# Patient Record
Sex: MALE | Race: WHITE | NOT HISPANIC OR LATINO | URBAN - METROPOLITAN AREA
[De-identification: names, ages, dates, MRNs, and addresses within clinical notes are randomized per-mention and may not be internally consistent; named-entity substitution may affect disease eponyms.]

---

## 2020-10-19 ENCOUNTER — OFFICE VISIT (OUTPATIENT)
Dept: URBAN - METROPOLITAN AREA CLINIC 83 | Facility: CLINIC | Age: 80
End: 2020-10-19
Payer: MEDICARE

## 2020-10-19 PROCEDURE — 99213 OFFICE O/P EST LOW 20 MIN: CPT | Performed by: OPHTHALMOLOGY

## 2020-10-19 PROCEDURE — 67028 INJECTION EYE DRUG: CPT | Performed by: OPHTHALMOLOGY

## 2020-10-19 PROCEDURE — 92134 CPTRZ OPH DX IMG PST SGM RTA: CPT | Performed by: OPHTHALMOLOGY

## 2020-10-19 ASSESSMENT — INTRAOCULAR PRESSURE
OD: 15
OS: 17

## 2020-10-19 NOTE — IMPRESSION/PLAN
Impression: Exudative age-related macular degeneration, left eye, with active choroidal neovascularization: H35.3221. Left. s/p Lucentis 8/10/20 Plan: Stable PED on exam and OCT. I have recommended continuing anti-VEGF treatment and gradually extending the treatment interval as tolerated to maintain vision. The diagnosis, natural history, and prognosis of exudative AMD, as well as the R/B/A of anti-VEGF were discussed. The patient understands that treatment may not improve vision but should reduce the risk of further visual loss. The patient elects to continue intravitreal Lucentis treatment under OCT guidance. Lucentis 0.5 OS was performed successfully per protocol without complication. 

RTC 12 weeks OCT OU reeval Lucentis

## 2021-01-11 ENCOUNTER — OFFICE VISIT (OUTPATIENT)
Dept: URBAN - METROPOLITAN AREA CLINIC 83 | Facility: CLINIC | Age: 81
End: 2021-01-11
Payer: MEDICARE

## 2021-01-11 DIAGNOSIS — H35.3112 NONEXUDATIVE AGE-RELATED MACULAR DEGENERATION, RIGHT EYE, INTERMEDIATE DRY STAGE: ICD-10-CM

## 2021-01-11 PROCEDURE — 92134 CPTRZ OPH DX IMG PST SGM RTA: CPT | Performed by: OPHTHALMOLOGY

## 2021-01-11 PROCEDURE — 67028 INJECTION EYE DRUG: CPT | Performed by: OPHTHALMOLOGY

## 2021-01-11 PROCEDURE — 99213 OFFICE O/P EST LOW 20 MIN: CPT | Performed by: OPHTHALMOLOGY

## 2021-01-11 ASSESSMENT — INTRAOCULAR PRESSURE
OD: 14
OS: 16

## 2021-01-11 NOTE — IMPRESSION/PLAN
Impression: Exudative age-related macular degeneration, left eye, with active choroidal neovascularization: H35.3221. Left. s/p Lucentis 10/19/2020 Plan: Stable PED with mild SRF on exam and OCT. I have recommended continuing anti-VEGF treatment q12 weeks to maintain vision. The diagnosis, natural history, and prognosis of exudative AMD, as well as the R/B/A of anti-VEGF were discussed. The patient understands that treatment may not improve vision but should reduce the risk of further visual loss. The patient elects to continue intravitreal Lucentis treatment under OCT guidance. Lucentis 0.5 OS was performed successfully per protocol without complication. 

RTC 12 weeks OCT OU reeval Lucentis

## 2021-04-05 ENCOUNTER — OFFICE VISIT (OUTPATIENT)
Dept: URBAN - METROPOLITAN AREA CLINIC 54 | Facility: CLINIC | Age: 81
End: 2021-04-05
Payer: MEDICARE

## 2021-04-05 PROCEDURE — 67028 INJECTION EYE DRUG: CPT | Performed by: OPHTHALMOLOGY

## 2021-04-05 PROCEDURE — 92014 COMPRE OPH EXAM EST PT 1/>: CPT | Performed by: OPHTHALMOLOGY

## 2021-04-05 PROCEDURE — 92134 CPTRZ OPH DX IMG PST SGM RTA: CPT | Performed by: OPHTHALMOLOGY

## 2021-04-05 ASSESSMENT — INTRAOCULAR PRESSURE
OS: 19
OD: 19

## 2021-04-05 NOTE — IMPRESSION/PLAN
Impression: Exudative age-related macular degeneration, left eye, with active choroidal neovascularization: H35.3221. Left. s/p Lucentis 10/19/2020 Plan: Last treatment 12 weeks ago. Exam/OCT reveals new SRH along inferior border of PED OD. Recommend repeat treatment and shortening treatment interval.  Lucentis OS. 

RTC 4 weeks OCT OU reeval Lucentis

## 2021-05-03 ENCOUNTER — OFFICE VISIT (OUTPATIENT)
Dept: URBAN - METROPOLITAN AREA CLINIC 83 | Facility: CLINIC | Age: 81
End: 2021-05-03
Payer: MEDICARE

## 2021-05-03 PROCEDURE — 67028 INJECTION EYE DRUG: CPT | Performed by: OPHTHALMOLOGY

## 2021-05-03 PROCEDURE — 92134 CPTRZ OPH DX IMG PST SGM RTA: CPT | Performed by: OPHTHALMOLOGY

## 2021-05-03 ASSESSMENT — INTRAOCULAR PRESSURE
OS: 16
OD: 13

## 2021-06-07 ENCOUNTER — OFFICE VISIT (OUTPATIENT)
Dept: URBAN - METROPOLITAN AREA CLINIC 83 | Facility: CLINIC | Age: 81
End: 2021-06-07
Payer: MEDICARE

## 2021-06-07 PROCEDURE — 92134 CPTRZ OPH DX IMG PST SGM RTA: CPT | Performed by: OPHTHALMOLOGY

## 2021-06-07 PROCEDURE — 67028 INJECTION EYE DRUG: CPT | Performed by: OPHTHALMOLOGY

## 2021-06-07 ASSESSMENT — INTRAOCULAR PRESSURE
OD: 15
OS: 17

## 2021-06-07 NOTE — IMPRESSION/PLAN
Impression: Exudative age-related macular degeneration, left eye, with active choroidal neovascularization: H35.3221. Left. s/p Lucentis 5/3/21 Plan: S/P Lucentis 4 weeks ago. Exam/OCT reveals gradually improving SRH along inferior border of PED OD. Recommend repeat treatment q4 weeks. RBA discussed. Pt elects Lucentis OS, no complications enountered. 

RTC 4 weeks OCT OU reeval Lucentis
100

## 2021-07-12 ENCOUNTER — OFFICE VISIT (OUTPATIENT)
Dept: URBAN - METROPOLITAN AREA CLINIC 83 | Facility: CLINIC | Age: 81
End: 2021-07-12
Payer: MEDICARE

## 2021-07-12 PROCEDURE — 67028 INJECTION EYE DRUG: CPT | Performed by: OPHTHALMOLOGY

## 2021-07-12 PROCEDURE — 92134 CPTRZ OPH DX IMG PST SGM RTA: CPT | Performed by: OPHTHALMOLOGY

## 2021-07-12 ASSESSMENT — INTRAOCULAR PRESSURE
OD: 15
OS: 19

## 2021-07-12 NOTE — IMPRESSION/PLAN
Impression: Nonexudative age-related macular degeneration, right eye, intermediate dry stage: H35.3112. Right. Plan: Exam shows macular drusen, however, OCT confirms absence of IRF or SRF. AREDS-2 protocol antioxidant vitamins and Amsler grid self-monitoring were reviewed. The patient was encouraged to avoid smoking, wear UV protection, and call our office immediately upon noticing decreased vision or metamorphopsia.

## 2021-07-12 NOTE — IMPRESSION/PLAN
Impression: Exudative age-related macular degeneration, left eye, with active choroidal neovascularization: H35.3221. Left. s/p Lucentis 6/7/21 Plan: S/P Lucentis 5 weeks ago. Exam/OCT continue to demonstrate gradually improving SRH along inferior border of PED OD. Recommend repeat treatment q5 weeks. RBA discussed. Pt elects Lucentis OS, no complications encountered. 

5 weeks [employer]
10 weeks for OCT OU re-eval for [facility]

## 2021-09-20 ENCOUNTER — OFFICE VISIT (OUTPATIENT)
Dept: URBAN - METROPOLITAN AREA CLINIC 83 | Facility: CLINIC | Age: 81
End: 2021-09-20
Payer: MEDICARE

## 2021-09-20 DIAGNOSIS — H35.3221 EXUDATIVE AGE-RELATED MACULAR DEGENERATION, LEFT EYE, WITH ACTIVE CHOROIDAL NEOVASCULARIZATION: Primary | ICD-10-CM

## 2021-09-20 PROCEDURE — 92134 CPTRZ OPH DX IMG PST SGM RTA: CPT | Performed by: OPHTHALMOLOGY

## 2021-09-20 PROCEDURE — 67028 INJECTION EYE DRUG: CPT | Performed by: OPHTHALMOLOGY

## 2021-09-20 PROCEDURE — 92014 COMPRE OPH EXAM EST PT 1/>: CPT | Performed by: OPHTHALMOLOGY

## 2021-09-20 ASSESSMENT — INTRAOCULAR PRESSURE
OS: 18
OD: 19

## 2021-09-20 NOTE — IMPRESSION/PLAN
Impression: Exudative age-related macular degeneration, left eye, with active choroidal neovascularization: H35.3221. Left. s/p Lucentis OS 8/16/21 Plan: S/P Lucentis 5 weeks ago in [place], [place]. Exam/OCT reveal resolved SRH along inferior border of PED OD. No definite fluid. Recommend repeat treatment with extension to 6 weeks. RBA discussed. Pt elects Lucentis OS, no complications encountered. 

6 weeks for OCT OU re-eval for [facility]

## 2021-11-01 ENCOUNTER — OFFICE VISIT (OUTPATIENT)
Dept: URBAN - METROPOLITAN AREA CLINIC 83 | Facility: CLINIC | Age: 81
End: 2021-11-01
Payer: MEDICARE

## 2021-11-01 DIAGNOSIS — H02.839 DERMATOCHALASIS OF EYELID: ICD-10-CM

## 2021-11-01 DIAGNOSIS — Z96.1 PRESENCE OF INTRAOCULAR LENS: ICD-10-CM

## 2021-11-01 PROCEDURE — 67028 INJECTION EYE DRUG: CPT | Performed by: OPHTHALMOLOGY

## 2021-11-01 PROCEDURE — 92134 CPTRZ OPH DX IMG PST SGM RTA: CPT | Performed by: OPHTHALMOLOGY

## 2021-11-01 ASSESSMENT — INTRAOCULAR PRESSURE
OS: 17
OD: 15

## 2021-11-01 NOTE — IMPRESSION/PLAN
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 Plan: Visually significant.   Refer for eval/management

## 2021-11-01 NOTE — IMPRESSION/PLAN
Impression: Exudative age-related macular degeneration, left eye, with active choroidal neovascularization: H35.3221. Left. s/p Lucentis OS 9/20/21
last full DFE OU 9/20/21 Plan: Exam/OCT demonstrate a stable PED OD. No definite fluid or heme. Recommend repeat treatment with maintenance of treatment interval at 6 weeks. RBA discussed. Pt elects Lucentis OS, no complications encountered. 

6 weeks for OCT OU re-eval for Lucentis 0.5 vs Eylea

## 2021-11-11 ENCOUNTER — OFFICE VISIT (OUTPATIENT)
Dept: URBAN - NONMETROPOLITAN AREA CLINIC 1 | Facility: CLINIC | Age: 81
End: 2021-11-11
Payer: MEDICARE

## 2021-11-11 DIAGNOSIS — D31.32 BENIGN NEOPLASM OF LEFT CHOROID: ICD-10-CM

## 2021-11-11 DIAGNOSIS — H35.3223 EXUDATIVE AGE-REL MCLR DEGN, LEFT EYE, WITH INACTIVE SCAR: Primary | ICD-10-CM

## 2021-11-11 DIAGNOSIS — H35.40 PERIPHERAL RETINAL DEGENERATION: ICD-10-CM

## 2021-11-11 DIAGNOSIS — H35.371 PUCKERING OF MACULA, RIGHT EYE: ICD-10-CM

## 2021-11-11 PROCEDURE — 92082 INTERMEDIATE VISUAL FIELD XM: CPT | Performed by: OPTOMETRIST

## 2021-11-11 PROCEDURE — 99204 OFFICE O/P NEW MOD 45 MIN: CPT | Performed by: OPTOMETRIST

## 2021-11-11 PROCEDURE — 92250 FUNDUS PHOTOGRAPHY W/I&R: CPT | Performed by: OPTOMETRIST

## 2021-11-11 ASSESSMENT — INTRAOCULAR PRESSURE
OS: 15
OD: 16

## 2021-11-11 ASSESSMENT — KERATOMETRY
OD: 42.38
OS: 42.50

## 2021-11-11 NOTE — IMPRESSION/PLAN
Impression: Nexdtve age-related mclr degn, right eye, intermed dry stage: H35.3112. Plan: Monitor vision daily. Changes in vision call the office. AREDS daily. Follow up with Dr. Lesa Simmonds as scheduled.

## 2021-11-11 NOTE — IMPRESSION/PLAN
Impression: Exudative age-rel mclr degn, left eye, with inactive scar: C21.9578. Plan: Monitor vision daily. Changes in vision call the office. AREDS daily. Follow up with Dr. Raajni Vaca as scheduled.

## 2021-12-13 ENCOUNTER — OFFICE VISIT (OUTPATIENT)
Dept: URBAN - METROPOLITAN AREA CLINIC 83 | Facility: CLINIC | Age: 81
End: 2021-12-13
Payer: MEDICARE

## 2021-12-13 PROCEDURE — 67028 INJECTION EYE DRUG: CPT | Performed by: OPHTHALMOLOGY

## 2021-12-13 PROCEDURE — 92134 CPTRZ OPH DX IMG PST SGM RTA: CPT | Performed by: OPHTHALMOLOGY

## 2021-12-13 ASSESSMENT — INTRAOCULAR PRESSURE
OS: 18
OD: 14

## 2021-12-13 NOTE — IMPRESSION/PLAN
Impression: Exudative age-related macular degeneration, left eye, with active choroidal neovascularization: H35.3221. Left. s/p Lucentis OS 11/01/21
last full DFE OU 9/20/21 Plan: Exam/OCT demonstrate a stable, large PED OD. No obvious fluid or heme. Recommend repeat treatment with maintenance of treatment interval at 6 weeks. RBA discussed. Pt elects Lucentis OS, no complications encountered. 

6 weeks for OCT OU re-eval for Lucentis 0.5 vs Eylea

## 2021-12-13 NOTE — IMPRESSION/PLAN
Impression: Nonexudative age-related macular degeneration, right eye, intermediate dry stage: H35.3112. Right. Plan: Exam shows stable macular drusen. OCT confirms absence of IRF or SRF. AREDS-2 protocol antioxidant vitamins and Amsler grid self-monitoring were reviewed. The patient was encouraged to avoid smoking, wear UV protection, and call our office immediately upon noticing decreased vision or metamorphopsia.

## 2022-01-24 ENCOUNTER — OFFICE VISIT (OUTPATIENT)
Dept: URBAN - METROPOLITAN AREA CLINIC 83 | Facility: CLINIC | Age: 82
End: 2022-01-24
Payer: MEDICARE

## 2022-01-24 PROCEDURE — 92134 CPTRZ OPH DX IMG PST SGM RTA: CPT | Performed by: OPHTHALMOLOGY

## 2022-01-24 PROCEDURE — 67028 INJECTION EYE DRUG: CPT | Performed by: OPHTHALMOLOGY

## 2022-01-24 ASSESSMENT — INTRAOCULAR PRESSURE
OD: 14
OS: 19

## 2022-01-24 NOTE — IMPRESSION/PLAN
Impression: Exudative age-related macular degeneration, left eye, with active choroidal neovascularization: H35.3221. Left. s/p Lucentis OS 12/13/21
last full DFE OU 9/20/21 Plan: Exam/OCT continue to reveal a stable, large PED OS. Still no obvious fluid or heme. Recommend repeat treatment with maintenance of treatment interval at 6 weeks. RBA discussed. Pt elects Lucentis OS, no complications encountered. 

6 weeks for OCT OU re-eval for Lucentis 0.5 vs Eylea, DFE OU (semiannual)

## 2022-01-24 NOTE — IMPRESSION/PLAN
Impression: Nonexudative age-related macular degeneration, right eye, intermediate dry stage: H35.3112. Right. Plan: Exam reveals stable macular drusen. OCT confirms absence of IRF or SRF. AREDS-2 protocol antioxidant vitamins and Amsler grid self-monitoring were reviewed. The patient was encouraged to avoid smoking, wear UV protection, and call our office immediately upon noticing decreased vision or metamorphopsia.

## 2022-03-14 ENCOUNTER — OFFICE VISIT (OUTPATIENT)
Dept: URBAN - METROPOLITAN AREA CLINIC 83 | Facility: CLINIC | Age: 82
End: 2022-03-14
Payer: MEDICARE

## 2022-03-14 PROCEDURE — 92134 CPTRZ OPH DX IMG PST SGM RTA: CPT | Performed by: OPHTHALMOLOGY

## 2022-03-14 PROCEDURE — 67028 INJECTION EYE DRUG: CPT | Performed by: OPHTHALMOLOGY

## 2022-03-14 PROCEDURE — 92014 COMPRE OPH EXAM EST PT 1/>: CPT | Performed by: OPHTHALMOLOGY

## 2022-03-14 ASSESSMENT — INTRAOCULAR PRESSURE
OS: 16
OD: 15

## 2022-03-14 NOTE — IMPRESSION/PLAN
Impression: Exudative age-related macular degeneration, left eye, with active choroidal neovascularization: H35.3221. Left. s/p Lucentis OS 1/24/22
last full DFE OU 9/20/21 Plan: Both eyes are due for full dilated semiannual exam today. Exam/OCT demonstrate a stable PED OS. Still no obvious fluid or heme. Recommend repeat treatment with extension of treatment interval to 8 weeks. RBA discussed. Pt elects Lucentis OS, no complications encountered. 

8 weeks for OCT OU Lucentis 0.5 OS #2/3 (straight)

## 2022-05-09 ENCOUNTER — PROCEDURE (OUTPATIENT)
Dept: URBAN - METROPOLITAN AREA CLINIC 83 | Facility: CLINIC | Age: 82
End: 2022-05-09
Payer: MEDICARE

## 2022-05-09 DIAGNOSIS — H35.3221 EXUDATIVE AGE-RELATED MACULAR DEGENERATION, LEFT EYE, WITH ACTIVE CHOROIDAL NEOVASCULARIZATION: Primary | ICD-10-CM

## 2022-05-09 PROCEDURE — 67028 INJECTION EYE DRUG: CPT | Performed by: OPHTHALMOLOGY

## 2022-05-09 PROCEDURE — 92134 CPTRZ OPH DX IMG PST SGM RTA: CPT | Performed by: OPHTHALMOLOGY

## 2022-05-09 ASSESSMENT — INTRAOCULAR PRESSURE
OS: 18
OD: 16

## 2022-05-09 NOTE — IMPRESSION/PLAN
Impression: Exudative age-related macular degeneration, left eye, with active choroidal neovascularization: H35.3221. Left. s/p Lucentis OS 03/14/2022
last full DFE OU 3/14/22 Plan: OCT: stable PED OS. No definite fluid or heme. Recommend repeat treatment, maintain q8 week treatment frequency. RBA discussed. Pt elects Lucentis OS, no complications encountered. 

8 weeks for OCT OU Lucentis 0.5 OS #3/3 (straight)

## 2022-07-18 ENCOUNTER — PROCEDURE (OUTPATIENT)
Dept: URBAN - METROPOLITAN AREA CLINIC 83 | Facility: CLINIC | Age: 82
End: 2022-07-18
Payer: MEDICARE

## 2022-07-18 DIAGNOSIS — H35.3221 EXUDATIVE AGE-RELATED MACULAR DEGENERATION, LEFT EYE, WITH ACTIVE CHOROIDAL NEOVASCULARIZATION: Primary | ICD-10-CM

## 2022-07-18 PROCEDURE — 67028 INJECTION EYE DRUG: CPT | Performed by: OPHTHALMOLOGY

## 2022-07-18 PROCEDURE — 92134 CPTRZ OPH DX IMG PST SGM RTA: CPT | Performed by: OPHTHALMOLOGY

## 2022-07-18 ASSESSMENT — INTRAOCULAR PRESSURE
OD: 11
OS: 11

## 2022-07-18 NOTE — IMPRESSION/PLAN
Impression: Exudative age-related macular degeneration, left eye, with active choroidal neovascularization: H35.3221. Left. s/p Lucentis OS 05/09/2022 Plan: OCT: stable PED OS at 10 weeks s/p Lucentis. No recurrent fluid or heme. Recommend repeat treatment, extend to q10 week treatment interval.  RBA discussed. Pt elects Lucentis OS, no complications encountered. 

10 weeks for OCT OU, Re-Eval Lucentis 0.5 OS

## 2022-09-26 ENCOUNTER — OFFICE VISIT (OUTPATIENT)
Dept: URBAN - METROPOLITAN AREA CLINIC 83 | Facility: CLINIC | Age: 82
End: 2022-09-26
Payer: MEDICARE

## 2022-09-26 DIAGNOSIS — Z96.1 PRESENCE OF INTRAOCULAR LENS: ICD-10-CM

## 2022-09-26 DIAGNOSIS — H35.371 PUCKERING OF MACULA, RIGHT EYE: ICD-10-CM

## 2022-09-26 DIAGNOSIS — H35.3231 EXUDATIVE AGE-RELATED MACULAR DEGENERATION, BILATERAL, WITH ACTIVE CHOROIDAL NEOVASCULARIZATION: Primary | ICD-10-CM

## 2022-09-26 DIAGNOSIS — H35.3221 EXUDATIVE AGE-RELATED MACULAR DEGENERATION, LEFT EYE, WITH ACTIVE CHOROIDAL NEOVASCULARIZATION: ICD-10-CM

## 2022-09-26 PROCEDURE — 92134 CPTRZ OPH DX IMG PST SGM RTA: CPT | Performed by: OPHTHALMOLOGY

## 2022-09-26 PROCEDURE — 99214 OFFICE O/P EST MOD 30 MIN: CPT | Performed by: OPHTHALMOLOGY

## 2022-09-26 ASSESSMENT — INTRAOCULAR PRESSURE
OD: 12
OS: 12

## 2022-09-26 NOTE — IMPRESSION/PLAN
Impression: Exudative age-related macular degeneration, bilateral, with active choroidal neovascularization: H35.3231.
s/p Lucentis OS 07/18/2022 New-onset [address] OD 9/26/22 Plan: --exam/OCT reveal new-onset SRH OD secondary to CNV
--stable PED with resolved SRF OS
--findings/diagnosis d/w patient in detail --rec initiation of monthly anti-VEGF therapy OD to maintain vision  
--rec reducing tx interval OS to q12 weeks
--r/b/a of Avastin OU for NVAMD discussed --pt understands Avastin is considered off-label for NVAMD
--pt elects to proceed intravitreal Avastin 1.25 mg/.05 ml OU
--injection OU performed w/o complication, overfill discarded
--plan series of 3 monthly injections OD to stabilize CNV
--pt instructed to call immediately with s/s [facility] loss/pain RTC 1 mo for Lucentis 0.5 OD #2/3 (straight)

## 2022-10-24 ENCOUNTER — PROCEDURE (OUTPATIENT)
Dept: URBAN - METROPOLITAN AREA CLINIC 83 | Facility: CLINIC | Age: 82
End: 2022-10-24
Payer: MEDICARE

## 2022-10-24 DIAGNOSIS — H35.3221 EXUDATIVE AGE-RELATED MACULAR DEGENERATION, LEFT EYE, WITH ACTIVE CHOROIDAL NEOVASCULARIZATION: ICD-10-CM

## 2022-10-24 DIAGNOSIS — H35.3231 EXUDATIVE AGE-RELATED MACULAR DEGENERATION, BILATERAL, WITH ACTIVE CHOROIDAL NEOVASCULARIZATION: Primary | ICD-10-CM

## 2022-10-24 PROCEDURE — 67028 INJECTION EYE DRUG: CPT | Performed by: OPHTHALMOLOGY

## 2022-10-24 ASSESSMENT — INTRAOCULAR PRESSURE
OS: 16
OD: 14

## 2022-11-03 ENCOUNTER — OFFICE VISIT (OUTPATIENT)
Dept: URBAN - NONMETROPOLITAN AREA CLINIC 1 | Facility: CLINIC | Age: 82
End: 2022-11-03
Payer: MEDICARE

## 2022-11-03 DIAGNOSIS — H35.3223 EXUDATIVE AGE-REL MCLR DEGN, LEFT EYE, WITH INACTIVE SCAR: ICD-10-CM

## 2022-11-03 DIAGNOSIS — H35.3211 EXDTVE AGE-REL MCLR DEGN, RIGHT EYE, WITH ACTV CHRDL NEOVAS: Primary | ICD-10-CM

## 2022-11-03 PROCEDURE — 92250 FUNDUS PHOTOGRAPHY W/I&R: CPT | Performed by: OPTOMETRIST

## 2022-11-03 PROCEDURE — 99213 OFFICE O/P EST LOW 20 MIN: CPT | Performed by: OPTOMETRIST

## 2022-11-03 ASSESSMENT — KERATOMETRY
OD: 42.75
OS: 42.75

## 2022-11-03 ASSESSMENT — INTRAOCULAR PRESSURE
OD: 16
OS: 16

## 2022-11-21 ENCOUNTER — OFFICE VISIT (OUTPATIENT)
Dept: URBAN - METROPOLITAN AREA CLINIC 83 | Facility: CLINIC | Age: 82
End: 2022-11-21
Payer: MEDICARE

## 2022-11-21 DIAGNOSIS — H35.3211 EXUDATIVE AGE-RELATED MACULAR DEGENERATION, RIGHT EYE, WITH ACTIVE CHOROIDAL NEOVASCULARIZATION: Primary | ICD-10-CM

## 2022-11-21 PROCEDURE — 67028 INJECTION EYE DRUG: CPT | Performed by: OPHTHALMOLOGY

## 2022-11-21 ASSESSMENT — INTRAOCULAR PRESSURE
OS: 13
OD: 12

## 2022-12-19 ENCOUNTER — OFFICE VISIT (OUTPATIENT)
Dept: URBAN - METROPOLITAN AREA CLINIC 83 | Facility: CLINIC | Age: 82
End: 2022-12-19
Payer: MEDICARE

## 2022-12-19 DIAGNOSIS — Z96.1 PRESENCE OF INTRAOCULAR LENS: ICD-10-CM

## 2022-12-19 DIAGNOSIS — H35.3231 EXUDATIVE AGE-RELATED MACULAR DEGENERATION, BILATERAL, WITH ACTIVE CHOROIDAL NEOVASCULARIZATION: Primary | ICD-10-CM

## 2022-12-19 DIAGNOSIS — H35.371 PUCKERING OF MACULA, RIGHT EYE: ICD-10-CM

## 2022-12-19 PROCEDURE — 92134 CPTRZ OPH DX IMG PST SGM RTA: CPT | Performed by: OPHTHALMOLOGY

## 2022-12-19 PROCEDURE — 99213 OFFICE O/P EST LOW 20 MIN: CPT | Performed by: OPHTHALMOLOGY

## 2022-12-19 ASSESSMENT — INTRAOCULAR PRESSURE
OS: 17
OD: 17

## 2022-12-19 NOTE — IMPRESSION/PLAN
Impression: Exudative age-related macular degeneration, bilateral, with active choroidal neovascularization: H35.3231.
s/p Lucentis OS 09/26/2022, OD 11/21/22 New-onset [address] OD 9/26/22 Plan: --exam/OCT reveal improving SRH OD s/p Lucentis 11/21/22
--stable PED with resolved SRF OS s/p Lucentis 9/26/22
--findings/diagnosis d/w patient in detail --rec  anti-VEGF therapy OU to maintain vision 
--rec cont tx interval OS at q12 weeks, OD q 6 weeks
--r/b/a of Avastin vs Lucentis 0.5 OU for NVAMD discussed --pt understands Avastin is considered off-label for NVAMD
--pt elects to proceed intravitreal Lucentis 0.5 OU
--injection OU performed w/o complication, overfill discarded --pt instructed to call immediately with s/s [facility] loss/pain RTC  6 weeks for Lucentis 0.5 OD  (straight) RTC 12 weeks for OCT OU re-eval for Lucentis

## 2023-02-06 ENCOUNTER — PROCEDURE (OUTPATIENT)
Facility: LOCATION | Age: 83
End: 2023-02-06
Payer: MEDICARE

## 2023-02-06 DIAGNOSIS — H35.3231 EXUDATIVE AGE-RELATED MACULAR DEGENERATION, BILATERAL, WITH ACTIVE CHOROIDAL NEOVASCULARIZATION: Primary | ICD-10-CM

## 2023-02-06 PROCEDURE — 67028 INJECTION EYE DRUG: CPT | Performed by: OPHTHALMOLOGY

## 2023-02-06 ASSESSMENT — INTRAOCULAR PRESSURE
OD: 18
OS: 19

## 2023-03-20 ENCOUNTER — OFFICE VISIT (OUTPATIENT)
Facility: LOCATION | Age: 83
End: 2023-03-20
Payer: MEDICARE

## 2023-03-20 DIAGNOSIS — Z96.1 PRESENCE OF INTRAOCULAR LENS: ICD-10-CM

## 2023-03-20 DIAGNOSIS — H35.371 PUCKERING OF MACULA, RIGHT EYE: ICD-10-CM

## 2023-03-20 DIAGNOSIS — H35.3231 EXUDATIVE AGE-RELATED MACULAR DEGENERATION, BILATERAL, WITH ACTIVE CHOROIDAL NEOVASCULARIZATION: Primary | ICD-10-CM

## 2023-03-20 PROCEDURE — 92134 CPTRZ OPH DX IMG PST SGM RTA: CPT | Performed by: OPHTHALMOLOGY

## 2023-03-20 PROCEDURE — 99213 OFFICE O/P EST LOW 20 MIN: CPT | Performed by: OPHTHALMOLOGY

## 2023-03-20 ASSESSMENT — INTRAOCULAR PRESSURE
OS: 21
OD: 22

## 2023-03-20 NOTE — IMPRESSION/PLAN
Impression: Exudative age-related macular degeneration, bilateral, with active choroidal neovascularization: H35.3231.
s/p Lucentis OS 12/19/2022, OD 02/06/23 New-onset [address] OD 9/26/22 Plan: --exam/OCT reveal resolved [address] OD s/p Lucentis 2/6/23
--stable PED with mild SRF OS s/p Lucentis 12/19/22
--findings/diagnosis d/w patient in detail --rec  anti-VEGF therapy OU to maintain vision 
--rec cont tx interval OS at q12 weeks, OD q6 weeks
--r/b/a of Avastin vs Lucentis 0.5 OU for NVAMD discussed --pt understands Avastin is considered off-label for NVAMD
--pt elects to proceed intravitreal Lucentis 0.5 OU
--injection OU performed w/o complication, overfill discarded --pt instructed to call immediately with s/s [facility] loss/pain RTC  6 weeks for Lucentis 0.5 OD  (straight) RTC 12 weeks for OCT OU re-eval for Lucentis

## 2023-05-01 ENCOUNTER — PROCEDURE (OUTPATIENT)
Facility: LOCATION | Age: 83
End: 2023-05-01
Payer: MEDICARE

## 2023-05-01 DIAGNOSIS — H35.3231 EXUDATIVE AGE-RELATED MACULAR DEGENERATION, BILATERAL, WITH ACTIVE CHOROIDAL NEOVASCULARIZATION: Primary | ICD-10-CM

## 2023-05-01 PROCEDURE — 67028 INJECTION EYE DRUG: CPT | Performed by: OPHTHALMOLOGY

## 2023-05-01 ASSESSMENT — INTRAOCULAR PRESSURE
OS: 21
OD: 20

## 2023-06-12 ENCOUNTER — OFFICE VISIT (OUTPATIENT)
Facility: LOCATION | Age: 83
End: 2023-06-12
Payer: MEDICARE

## 2023-06-12 DIAGNOSIS — H35.371 PUCKERING OF MACULA, RIGHT EYE: ICD-10-CM

## 2023-06-12 DIAGNOSIS — Z96.1 PRESENCE OF INTRAOCULAR LENS: ICD-10-CM

## 2023-06-12 DIAGNOSIS — H35.3231 EXUDATIVE AGE-RELATED MACULAR DEGENERATION, BILATERAL, WITH ACTIVE CHOROIDAL NEOVASCULARIZATION: Primary | ICD-10-CM

## 2023-06-12 PROCEDURE — 92134 CPTRZ OPH DX IMG PST SGM RTA: CPT | Performed by: OPHTHALMOLOGY

## 2023-06-12 PROCEDURE — 99213 OFFICE O/P EST LOW 20 MIN: CPT | Performed by: OPHTHALMOLOGY

## 2023-06-12 PROCEDURE — 67028 INJECTION EYE DRUG: CPT | Performed by: OPHTHALMOLOGY

## 2023-06-12 ASSESSMENT — INTRAOCULAR PRESSURE
OD: 16
OS: 14

## 2023-06-12 NOTE — IMPRESSION/PLAN
Impression: Exudative age-related macular degeneration, bilateral, with active choroidal neovascularization: H35.3231.
s/p Lucentis OS 03/20/23, OD 05/01/23 New-onset [address] OD 9/26/22 Plan: --exam/OCT reveal PED with mild, stable SRF OU 
--findings/diagnosis d/w patient in detail --rec cont tx interval OS at q12 weeks, OD q6 weeks
--r/b/a of Avastin vs Lucentis 0.5 OU for NVAMD discussed --pt understands Avastin is considered off-label for NVAMD
--pt elects to proceed intravitreal Lucentis 0.5 OU
--injection OU performed w/o complication, overfill discarded --pt instructed to call immediately with s/s [facility] loss/pain RTC  6 weeks for Lucentis 0.5 OD  (straight) RTC 12 weeks for OCT OU re-eval for Lucentis

## 2023-07-24 ENCOUNTER — PROCEDURE (OUTPATIENT)
Facility: LOCATION | Age: 83
End: 2023-07-24
Payer: MEDICARE

## 2023-07-24 DIAGNOSIS — H35.371 PUCKERING OF MACULA, RIGHT EYE: Primary | ICD-10-CM

## 2023-07-24 PROCEDURE — 67028 INJECTION EYE DRUG: CPT | Performed by: OPHTHALMOLOGY

## 2023-07-24 ASSESSMENT — INTRAOCULAR PRESSURE
OD: 20
OS: 17

## 2023-09-07 ENCOUNTER — OFFICE VISIT (OUTPATIENT)
Facility: LOCATION | Age: 83
End: 2023-09-07
Payer: MEDICARE

## 2023-09-07 DIAGNOSIS — H35.371 PUCKERING OF MACULA, RIGHT EYE: ICD-10-CM

## 2023-09-07 DIAGNOSIS — H35.3231 EXUDATIVE AGE-RELATED MACULAR DEGENERATION, BILATERAL, WITH ACTIVE CHOROIDAL NEOVASCULARIZATION: Primary | ICD-10-CM

## 2023-09-07 DIAGNOSIS — Z96.1 PRESENCE OF INTRAOCULAR LENS: ICD-10-CM

## 2023-09-07 PROCEDURE — 92134 CPTRZ OPH DX IMG PST SGM RTA: CPT | Performed by: OPHTHALMOLOGY

## 2023-09-07 PROCEDURE — 99213 OFFICE O/P EST LOW 20 MIN: CPT | Performed by: OPHTHALMOLOGY

## 2023-09-07 ASSESSMENT — INTRAOCULAR PRESSURE
OS: 19
OD: 20

## 2023-10-16 ENCOUNTER — OFFICE VISIT (OUTPATIENT)
Dept: URBAN - METROPOLITAN AREA CLINIC 7 | Facility: CLINIC | Age: 83
End: 2023-10-16
Payer: MEDICARE

## 2023-10-16 DIAGNOSIS — H35.3231 EXUDATIVE AGE-RELATED MACULAR DEGENERATION, BILATERAL, WITH ACTIVE CHOROIDAL NEOVASCULARIZATION: Primary | ICD-10-CM

## 2023-10-16 PROCEDURE — 67028 INJECTION EYE DRUG: CPT | Performed by: OPHTHALMOLOGY

## 2023-10-16 ASSESSMENT — INTRAOCULAR PRESSURE
OD: 17
OS: 15

## 2023-11-10 ENCOUNTER — OFFICE VISIT (OUTPATIENT)
Dept: URBAN - NONMETROPOLITAN AREA CLINIC 1 | Facility: CLINIC | Age: 83
End: 2023-11-10
Payer: MEDICARE

## 2023-11-10 DIAGNOSIS — H35.3231 EXUDATIVE AGE-RELATED MACULAR DEGENERATION, BILATERAL, WITH ACTIVE CHOROIDAL NEOVASCULARIZATION: Primary | ICD-10-CM

## 2023-11-10 DIAGNOSIS — H40.053 OCULAR HYPERTENSION, BILATERAL: ICD-10-CM

## 2023-11-10 DIAGNOSIS — Z96.1 PRESENCE OF INTRAOCULAR LENS: ICD-10-CM

## 2023-11-10 DIAGNOSIS — D31.32 BENIGN NEOPLASM OF LEFT CHOROID: ICD-10-CM

## 2023-11-10 PROCEDURE — 92250 FUNDUS PHOTOGRAPHY W/I&R: CPT | Performed by: OPTOMETRIST

## 2023-11-10 PROCEDURE — 99213 OFFICE O/P EST LOW 20 MIN: CPT | Performed by: OPTOMETRIST

## 2023-11-10 ASSESSMENT — VISUAL ACUITY
OD: 20/60
OS: 20/100

## 2023-11-10 ASSESSMENT — INTRAOCULAR PRESSURE
OD: 23
OS: 24

## 2023-11-10 ASSESSMENT — KERATOMETRY
OS: 42.38
OD: 42.13

## 2023-11-27 ENCOUNTER — OFFICE VISIT (OUTPATIENT)
Dept: URBAN - METROPOLITAN AREA CLINIC 7 | Facility: CLINIC | Age: 83
End: 2023-11-27
Payer: MEDICARE

## 2023-11-27 DIAGNOSIS — Z96.1 PRESENCE OF INTRAOCULAR LENS: ICD-10-CM

## 2023-11-27 DIAGNOSIS — H35.3231 EXUDATIVE AGE-RELATED MACULAR DEGENERATION, BILATERAL, WITH ACTIVE CHOROIDAL NEOVASCULARIZATION: Primary | ICD-10-CM

## 2023-11-27 DIAGNOSIS — D31.32 BENIGN NEOPLASM OF LEFT CHOROID: ICD-10-CM

## 2023-11-27 DIAGNOSIS — H40.053 OCULAR HYPERTENSION, BILATERAL: ICD-10-CM

## 2023-11-27 PROCEDURE — 99213 OFFICE O/P EST LOW 20 MIN: CPT | Performed by: OPHTHALMOLOGY

## 2023-11-27 PROCEDURE — 92134 CPTRZ OPH DX IMG PST SGM RTA: CPT | Performed by: OPHTHALMOLOGY

## 2023-11-27 ASSESSMENT — INTRAOCULAR PRESSURE
OD: 20
OS: 17

## 2024-01-08 ENCOUNTER — OFFICE VISIT (OUTPATIENT)
Dept: URBAN - METROPOLITAN AREA CLINIC 7 | Facility: CLINIC | Age: 84
End: 2024-01-08
Payer: MEDICARE

## 2024-01-08 PROCEDURE — 67028 INJECTION EYE DRUG: CPT | Performed by: OPHTHALMOLOGY

## 2024-01-08 ASSESSMENT — INTRAOCULAR PRESSURE
OS: 17
OD: 23

## 2024-02-10 NOTE — IMPRESSION/PLAN
Group Topic: BH Therapeutic Activity    Date: 2/10/2024  Start Time: 1030  End Time: 1200  Facilitators: Kelby Evans OT    Focus: Distress Tolerance  Number in attendance: 4    Method: Group  Attendance:  Stopped by group for a few minutes then left, declined activities offered.     Impression: Exdtve age-rel mclr degn, right eye, with actv chrdl neovas: H35.3211. Plan: Monitor vision daily. Changes in vision call the office. AREDS daily. Normal cholesterol and blood pressure important. Patient understands. Follow up after Dr. Erick Elena releases patient.

## 2024-02-12 ENCOUNTER — OFFICE VISIT (OUTPATIENT)
Dept: URBAN - METROPOLITAN AREA CLINIC 7 | Facility: CLINIC | Age: 84
End: 2024-02-12
Payer: MEDICARE

## 2024-02-12 DIAGNOSIS — H35.371 PUCKERING OF MACULA, RIGHT EYE: ICD-10-CM

## 2024-02-12 PROCEDURE — 92134 CPTRZ OPH DX IMG PST SGM RTA: CPT | Performed by: OPHTHALMOLOGY

## 2024-02-12 PROCEDURE — 99213 OFFICE O/P EST LOW 20 MIN: CPT | Performed by: OPHTHALMOLOGY

## 2024-02-12 ASSESSMENT — INTRAOCULAR PRESSURE
OS: 18
OD: 17

## 2024-03-11 ENCOUNTER — OFFICE VISIT (OUTPATIENT)
Dept: URBAN - NONMETROPOLITAN AREA CLINIC 1 | Facility: CLINIC | Age: 84
End: 2024-03-11
Payer: MEDICARE

## 2024-03-11 DIAGNOSIS — D31.32 BENIGN NEOPLASM OF LEFT CHOROID: ICD-10-CM

## 2024-03-11 DIAGNOSIS — Z96.1 PRESENCE OF INTRAOCULAR LENS: ICD-10-CM

## 2024-03-11 DIAGNOSIS — H35.3231 EXUDATIVE AGE-RELATED MACULAR DEGENERATION, BILATERAL, WITH ACTIVE CHOROIDAL NEOVASCULARIZATION: Primary | ICD-10-CM

## 2024-03-11 PROCEDURE — 92250 FUNDUS PHOTOGRAPHY W/I&R: CPT | Performed by: OPTOMETRIST

## 2024-03-11 PROCEDURE — 99213 OFFICE O/P EST LOW 20 MIN: CPT | Performed by: OPTOMETRIST

## 2024-03-11 ASSESSMENT — INTRAOCULAR PRESSURE
OS: 19
OD: 17

## 2024-03-11 ASSESSMENT — KERATOMETRY
OD: 42.00
OS: 42.00

## 2024-03-25 ENCOUNTER — OFFICE VISIT (OUTPATIENT)
Dept: URBAN - METROPOLITAN AREA CLINIC 7 | Facility: CLINIC | Age: 84
End: 2024-03-25
Payer: MEDICARE

## 2024-03-25 DIAGNOSIS — H35.3231 EXUDATIVE AGE-RELATED MACULAR DEGENERATION, BILATERAL, WITH ACTIVE CHOROIDAL NEOVASCULARIZATION: Primary | ICD-10-CM

## 2024-03-25 PROCEDURE — 67028 INJECTION EYE DRUG: CPT | Performed by: OPHTHALMOLOGY

## 2024-03-25 ASSESSMENT — INTRAOCULAR PRESSURE
OS: 18
OD: 19

## 2024-05-06 ENCOUNTER — OFFICE VISIT (OUTPATIENT)
Dept: URBAN - METROPOLITAN AREA CLINIC 7 | Facility: CLINIC | Age: 84
End: 2024-05-06
Payer: MEDICARE

## 2024-05-06 DIAGNOSIS — H35.3231 EXUDATIVE AGE-RELATED MACULAR DEGENERATION, BILATERAL, WITH ACTIVE CHOROIDAL NEOVASCULARIZATION: Primary | ICD-10-CM

## 2024-05-06 DIAGNOSIS — H35.371 PUCKERING OF MACULA, RIGHT EYE: ICD-10-CM

## 2024-05-06 DIAGNOSIS — H43.823 VITREOMACULAR ADHESION, BILATERAL: ICD-10-CM

## 2024-05-06 DIAGNOSIS — Z96.1 PRESENCE OF INTRAOCULAR LENS: ICD-10-CM

## 2024-05-06 PROCEDURE — 92134 CPTRZ OPH DX IMG PST SGM RTA: CPT | Performed by: OPHTHALMOLOGY

## 2024-05-06 PROCEDURE — 99213 OFFICE O/P EST LOW 20 MIN: CPT | Performed by: OPHTHALMOLOGY

## 2024-05-06 ASSESSMENT — INTRAOCULAR PRESSURE
OS: 17
OD: 14

## 2024-06-17 ENCOUNTER — OFFICE VISIT (OUTPATIENT)
Dept: URBAN - METROPOLITAN AREA CLINIC 7 | Facility: CLINIC | Age: 84
End: 2024-06-17
Payer: MEDICARE

## 2024-06-17 DIAGNOSIS — H35.3231 EXUDATIVE AGE-RELATED MACULAR DEGENERATION, BILATERAL, WITH ACTIVE CHOROIDAL NEOVASCULARIZATION: Primary | ICD-10-CM

## 2024-06-17 PROCEDURE — 67028 INJECTION EYE DRUG: CPT | Performed by: OPHTHALMOLOGY

## 2024-06-17 ASSESSMENT — INTRAOCULAR PRESSURE
OS: 14
OD: 15

## 2024-07-29 ENCOUNTER — OFFICE VISIT (OUTPATIENT)
Dept: URBAN - METROPOLITAN AREA CLINIC 7 | Facility: CLINIC | Age: 84
End: 2024-07-29
Payer: MEDICARE

## 2024-07-29 DIAGNOSIS — H35.371 PUCKERING OF MACULA, RIGHT EYE: ICD-10-CM

## 2024-07-29 DIAGNOSIS — H35.3231 EXUDATIVE AGE-RELATED MACULAR DEGENERATION, BILATERAL, WITH ACTIVE CHOROIDAL NEOVASCULARIZATION: Primary | ICD-10-CM

## 2024-07-29 DIAGNOSIS — Z96.1 PRESENCE OF INTRAOCULAR LENS: ICD-10-CM

## 2024-07-29 PROCEDURE — 99213 OFFICE O/P EST LOW 20 MIN: CPT | Performed by: OPHTHALMOLOGY

## 2024-07-29 PROCEDURE — 92134 CPTRZ OPH DX IMG PST SGM RTA: CPT | Performed by: OPHTHALMOLOGY

## 2024-07-29 ASSESSMENT — INTRAOCULAR PRESSURE
OS: 15
OD: 16

## 2024-09-13 ENCOUNTER — OFFICE VISIT (OUTPATIENT)
Dept: URBAN - METROPOLITAN AREA CLINIC 7 | Facility: CLINIC | Age: 84
End: 2024-09-13
Payer: MEDICARE

## 2024-09-13 DIAGNOSIS — H35.3231 EXUDATIVE AGE-RELATED MACULAR DEGENERATION, BILATERAL, WITH ACTIVE CHOROIDAL NEOVASCULARIZATION: Primary | ICD-10-CM

## 2024-09-13 PROCEDURE — 92134 CPTRZ OPH DX IMG PST SGM RTA: CPT | Performed by: OPHTHALMOLOGY

## 2024-09-13 ASSESSMENT — INTRAOCULAR PRESSURE
OD: 19
OS: 18

## 2024-10-23 NOTE — IMPRESSION/PLAN
Impression: Exudative age-related macular degeneration, left eye, with active choroidal neovascularization: H35.3221. Left. s/p Lucentis 4/5/21 Plan: Last treatment 4 weeks ago. Exam/OCT reveals stable SRH along inferior border of PED OD. Recommend repeat treatment q4 weeks. RBA discussed. Pt elects Lucentis OS, no complications enountered. 

RTC 4 weeks OCT OU reeval Lucentis Please call to schedule her routine follow-up

## 2024-10-25 ENCOUNTER — OFFICE VISIT (OUTPATIENT)
Dept: URBAN - METROPOLITAN AREA CLINIC 7 | Facility: CLINIC | Age: 84
End: 2024-10-25
Payer: MEDICARE

## 2024-10-25 DIAGNOSIS — H35.3231 EXUDATIVE AGE-RELATED MACULAR DEGENERATION, BILATERAL, WITH ACTIVE CHOROIDAL NEOVASCULARIZATION: Primary | ICD-10-CM

## 2024-10-25 PROCEDURE — 92134 CPTRZ OPH DX IMG PST SGM RTA: CPT | Performed by: OPHTHALMOLOGY

## 2024-10-25 ASSESSMENT — INTRAOCULAR PRESSURE
OS: 15
OD: 15

## 2024-11-01 ENCOUNTER — OFFICE VISIT (OUTPATIENT)
Dept: URBAN - METROPOLITAN AREA CLINIC 82 | Facility: CLINIC | Age: 84
End: 2024-11-01
Payer: MEDICARE

## 2024-11-01 DIAGNOSIS — H35.3231 EXUDATIVE AGE-RELATED MACULAR DEGENERATION, BILATERAL, WITH ACTIVE CHOROIDAL NEOVASCULARIZATION: Primary | ICD-10-CM

## 2024-11-01 DIAGNOSIS — H52.4 PRESBYOPIA: ICD-10-CM

## 2024-11-01 PROCEDURE — 99213 OFFICE O/P EST LOW 20 MIN: CPT | Performed by: OPTOMETRIST

## 2024-11-01 PROCEDURE — 92250 FUNDUS PHOTOGRAPHY W/I&R: CPT | Performed by: OPTOMETRIST

## 2024-11-01 ASSESSMENT — VISUAL ACUITY
OS: 20/150
OD: 20/70+

## 2024-11-01 ASSESSMENT — INTRAOCULAR PRESSURE
OS: 18
OD: 20

## 2024-11-01 ASSESSMENT — KERATOMETRY
OD: 42.13
OS: 42.50

## 2024-12-02 ENCOUNTER — OFFICE VISIT (OUTPATIENT)
Dept: URBAN - METROPOLITAN AREA CLINIC 7 | Facility: CLINIC | Age: 84
End: 2024-12-02
Payer: MEDICARE

## 2024-12-02 DIAGNOSIS — H35.371 PUCKERING OF MACULA, RIGHT EYE: ICD-10-CM

## 2024-12-02 DIAGNOSIS — H35.3231 EXUDATIVE AGE-RELATED MACULAR DEGENERATION, BILATERAL, WITH ACTIVE CHOROIDAL NEOVASCULARIZATION: Primary | ICD-10-CM

## 2024-12-02 DIAGNOSIS — Z96.1 PRESENCE OF INTRAOCULAR LENS: ICD-10-CM

## 2024-12-02 PROCEDURE — 92134 CPTRZ OPH DX IMG PST SGM RTA: CPT | Performed by: OPHTHALMOLOGY

## 2024-12-02 PROCEDURE — 92014 COMPRE OPH EXAM EST PT 1/>: CPT | Performed by: OPHTHALMOLOGY

## 2024-12-02 ASSESSMENT — INTRAOCULAR PRESSURE
OD: 17
OS: 16

## 2025-02-05 ENCOUNTER — OFFICE VISIT (OUTPATIENT)
Dept: URBAN - METROPOLITAN AREA CLINIC 7 | Facility: CLINIC | Age: 85
End: 2025-02-05
Payer: MEDICARE

## 2025-02-05 DIAGNOSIS — H35.3231 EXUDATIVE AGE-RELATED MACULAR DEGENERATION, BILATERAL, WITH ACTIVE CHOROIDAL NEOVASCULARIZATION: Primary | ICD-10-CM

## 2025-02-05 PROCEDURE — 92134 CPTRZ OPH DX IMG PST SGM RTA: CPT | Performed by: STUDENT IN AN ORGANIZED HEALTH CARE EDUCATION/TRAINING PROGRAM

## 2025-02-05 ASSESSMENT — INTRAOCULAR PRESSURE
OD: 19
OS: 18

## 2025-03-17 ENCOUNTER — OFFICE VISIT (OUTPATIENT)
Dept: URBAN - METROPOLITAN AREA CLINIC 7 | Facility: CLINIC | Age: 85
End: 2025-03-17
Payer: MEDICARE

## 2025-03-17 DIAGNOSIS — H35.3231 EXUDATIVE AGE-RELATED MACULAR DEGENERATION, BILATERAL, WITH ACTIVE CHOROIDAL NEOVASCULARIZATION: Primary | ICD-10-CM

## 2025-03-17 PROCEDURE — 92134 CPTRZ OPH DX IMG PST SGM RTA: CPT | Performed by: OPHTHALMOLOGY

## 2025-03-17 ASSESSMENT — INTRAOCULAR PRESSURE
OS: 18
OD: 16

## 2025-04-14 ENCOUNTER — OFFICE VISIT (OUTPATIENT)
Dept: URBAN - METROPOLITAN AREA CLINIC 82 | Facility: CLINIC | Age: 85
End: 2025-04-14
Payer: MEDICARE

## 2025-04-14 DIAGNOSIS — H35.3231 EXUDATIVE AGE-RELATED MACULAR DEGENERATION, BILATERAL, WITH ACTIVE CHOROIDAL NEOVASCULARIZATION: Primary | ICD-10-CM

## 2025-04-14 DIAGNOSIS — H26.492 OTHER SECONDARY CATARACT, LEFT EYE: ICD-10-CM

## 2025-04-14 PROCEDURE — 99213 OFFICE O/P EST LOW 20 MIN: CPT | Performed by: OPTOMETRIST

## 2025-04-14 PROCEDURE — 92134 CPTRZ OPH DX IMG PST SGM RTA: CPT | Performed by: OPTOMETRIST

## 2025-04-14 ASSESSMENT — INTRAOCULAR PRESSURE
OD: 16
OS: 16

## 2025-04-14 ASSESSMENT — KERATOMETRY
OD: 42.13
OS: 42.38

## 2025-04-28 ENCOUNTER — OFFICE VISIT (OUTPATIENT)
Dept: URBAN - METROPOLITAN AREA CLINIC 7 | Facility: CLINIC | Age: 85
End: 2025-04-28
Payer: MEDICARE

## 2025-04-28 DIAGNOSIS — H35.3231 EXUDATIVE AGE-RELATED MACULAR DEGENERATION, BILATERAL, WITH ACTIVE CHOROIDAL NEOVASCULARIZATION: Primary | ICD-10-CM

## 2025-04-28 DIAGNOSIS — Z96.1 PRESENCE OF INTRAOCULAR LENS: ICD-10-CM

## 2025-04-28 DIAGNOSIS — H35.371 PUCKERING OF MACULA, RIGHT EYE: ICD-10-CM

## 2025-04-28 PROCEDURE — 92134 CPTRZ OPH DX IMG PST SGM RTA: CPT | Performed by: OPHTHALMOLOGY

## 2025-04-28 PROCEDURE — 92014 COMPRE OPH EXAM EST PT 1/>: CPT | Performed by: OPHTHALMOLOGY

## 2025-04-28 ASSESSMENT — INTRAOCULAR PRESSURE
OS: 15
OD: 12

## 2025-06-09 ENCOUNTER — OFFICE VISIT (OUTPATIENT)
Dept: URBAN - METROPOLITAN AREA CLINIC 7 | Facility: CLINIC | Age: 85
End: 2025-06-09
Payer: MEDICARE

## 2025-06-09 DIAGNOSIS — H35.3231 EXUDATIVE AGE-RELATED MACULAR DEGENERATION, BILATERAL, WITH ACTIVE CHOROIDAL NEOVASCULARIZATION: Primary | ICD-10-CM

## 2025-06-09 PROCEDURE — 92134 CPTRZ OPH DX IMG PST SGM RTA: CPT | Performed by: OPHTHALMOLOGY

## 2025-06-09 ASSESSMENT — INTRAOCULAR PRESSURE
OD: 14
OS: 15

## 2025-07-21 ENCOUNTER — OFFICE VISIT (OUTPATIENT)
Dept: URBAN - METROPOLITAN AREA CLINIC 7 | Facility: CLINIC | Age: 85
End: 2025-07-21
Payer: MEDICARE

## 2025-07-21 DIAGNOSIS — H35.3231 EXUDATIVE AGE-RELATED MACULAR DEGENERATION, BILATERAL, WITH ACTIVE CHOROIDAL NEOVASCULARIZATION: Primary | ICD-10-CM

## 2025-07-21 PROCEDURE — 92134 CPTRZ OPH DX IMG PST SGM RTA: CPT | Performed by: OPHTHALMOLOGY

## 2025-07-21 ASSESSMENT — INTRAOCULAR PRESSURE
OS: 13
OD: 17